# Patient Record
Sex: MALE | Race: WHITE | NOT HISPANIC OR LATINO | Employment: FULL TIME | URBAN - METROPOLITAN AREA
[De-identification: names, ages, dates, MRNs, and addresses within clinical notes are randomized per-mention and may not be internally consistent; named-entity substitution may affect disease eponyms.]

---

## 2021-09-29 ENCOUNTER — OFFICE VISIT (OUTPATIENT)
Dept: URGENT CARE | Facility: URGENT CARE | Age: 51
End: 2021-09-29
Payer: OTHER MISCELLANEOUS

## 2021-09-29 VITALS
DIASTOLIC BLOOD PRESSURE: 90 MMHG | OXYGEN SATURATION: 99 % | HEART RATE: 51 BPM | TEMPERATURE: 97.7 F | RESPIRATION RATE: 20 BRPM | SYSTOLIC BLOOD PRESSURE: 138 MMHG

## 2021-09-29 DIAGNOSIS — S61.011A LACERATION OF RIGHT THUMB WITHOUT FOREIGN BODY WITHOUT DAMAGE TO NAIL, INITIAL ENCOUNTER: Primary | ICD-10-CM

## 2021-09-29 PROCEDURE — 12001 RPR S/N/AX/GEN/TRNK 2.5CM/<: CPT | Performed by: PHYSICIAN ASSISTANT

## 2021-09-29 RX ORDER — LISINOPRIL 5 MG/1
5 TABLET ORAL
COMMUNITY
Start: 2020-11-17

## 2021-09-29 RX ORDER — FAMOTIDINE 40 MG/1
TABLET, FILM COATED ORAL
COMMUNITY
Start: 2021-03-15

## 2021-09-29 RX ORDER — ROSUVASTATIN CALCIUM 20 MG/1
20 TABLET, COATED ORAL AT BEDTIME
COMMUNITY
Start: 2021-06-15

## 2021-09-29 NOTE — PATIENT INSTRUCTIONS
Patient Education     Laceration, Skin Adhesive  A laceration is a cut through the skin. You have a laceration that your healthcare provider has closed with skin adhesive, a type of skin glue.  Home care  You may take over-the-counter medicine such as acetaminophen, naproxen, or ibuprofen for pain, unless your provider prescribed another pain medicine. Talk with your healthcare provider before using these medicines if you have chronic liver or kidney disease or ever had a stomach ulcer or gastrointestinal bleeding.  General care    Keep the wound clean and dry. You may shower or bathe as usual, but don't use soaps, lotions, or ointments on the wound area. Don't scrub the wound. After bathing, pat the wound dry with a soft towel.    Don't scratch, rub, or pick at the adhesive film. Don't place tape directly over the film.    Don't apply liquids such as peroxide, ointments, or creams to the wound while the film is in place. These may dissolve the adhesive too soon.    Most skin wounds heal without problems. However, an infection sometimes occurs despite correct treatment. Watch for the signs of infection listed below.    Follow-up care  Follow up with your healthcare provider, or as advised. The adhesive film or skin glue usually falls off in 5 to 10 days.  When to seek medical advice  Call your healthcare provider right away if any of these occur:    Signs of infection:  ? Fever of 100.4 F (38 C) or higher, or as advised by your healthcare provider  ? Increasing pain in the wound  ? Increasing redness or swelling  ? Pus coming from the wound    Wound bleeds more than a small amount or bleeding doesn t stop    Glue comes off earlier than expected and the wound edges come apart    You feel numbness or weakness in the wound area that doesn t go away  Zainab last reviewed this educational content on 8/1/2019 2000-2021 [employer]. All rights reserved. This information is not intended as a substitute  for professional medical care. Always follow your healthcare professional's instructions.

## 2021-09-29 NOTE — PROGRESS NOTES
Assessment & Plan     Laceration of right thumb without foreign body without damage to nail, initial encounter    Repaired as below with dermabond  Keep wound clean and dry for the next 24-48 hours  Ok to shower and get wound wet after 48 hours, but do not soak for 2 weeks  Wound follow-up: Skin adhesive will peel off on its own  May return to work/school as long as wound is kept clean and dry  Discussed the probability of scarring  Active range of motion exercises encouraged for finger lacerations    Patient will return immediately if they experience redness around the laceration, drainage, worsening pain, or fever.     He agrees.    - REPAIR SUPERFICIAL, WOUND BODY < =2.5CM    Return in about 1 week (around 10/6/2021) for Symptoms failing to improve.    Annalee Zapata PA-C  [facility] [place] 51 year old male who presents to clinic today for the following health issues:  Chief Complaint   Patient presents with     Urgent Care     Laceration     Right thumb laceration around 11am-Tetanus last year      HPI      Laceration    Mechanism of injury: inadvertently sustained a cut with a   History provided by: Patient  Time of injury was 3 hours(s) ago.    This is a work related injury.    Associated symptoms: Denies numbness, weakness, or loss of function    Last tetanus booster within 10 years: Yes    LACERATION EXAM:   Size of laceration: 2 centimeters  Characteristics of the laceration: clean and linear  Depth of laceration: deep  Tendon function intact: Yes  Sensation to light touch intact: Yes  Pulses/capillary refill intact: Yes  Foreign body: No    Picture included in patient's chart: no    PROCEDURE NOTE:  Anesthesia: None  Prepped and draped in the usual sterile fashion  Wound irrigated with sterile water  Wound was explored for any foreign bodies and evaluated for tendon, nerve, vessel or joint involvement.    Closure was simple  Laceration was closed  with Dermabond  Good skin approximation obtained, good hemostasis obtained.  Bandage was applied  Patient tolerated the procedure well        Review of Systems  Constitutional, HEENT, cardiovascular, pulmonary, GI, , musculoskeletal, neuro, skin, endocrine and psych systems are negative, except as otherwise noted.      Objective    BP (!) 138/90   Pulse 51   Temp 97.7  F (36.5  C) (Oral)   Resp 20   SpO2 99%   Physical Exam   GENERAL: healthy, alert and no distress  SKIN: There is about a 2 cm laceration noted on the dorsum of the right thumb at the interphalangeal joint. Bleeding is controlled. Range of motion of the thumb is intact.